# Patient Record
Sex: MALE | Race: AMERICAN INDIAN OR ALASKA NATIVE
[De-identification: names, ages, dates, MRNs, and addresses within clinical notes are randomized per-mention and may not be internally consistent; named-entity substitution may affect disease eponyms.]

---

## 2017-03-09 ENCOUNTER — HOSPITAL ENCOUNTER (INPATIENT)
Dept: HOSPITAL 5 - ED | Age: 52
LOS: 3 days | Discharge: HOME | DRG: 313 | End: 2017-03-12
Attending: HOSPITALIST | Admitting: INTERNAL MEDICINE
Payer: COMMERCIAL

## 2017-03-09 DIAGNOSIS — R91.1: ICD-10-CM

## 2017-03-09 DIAGNOSIS — Z79.899: ICD-10-CM

## 2017-03-09 DIAGNOSIS — I10: ICD-10-CM

## 2017-03-09 DIAGNOSIS — R16.0: ICD-10-CM

## 2017-03-09 DIAGNOSIS — R07.89: Primary | ICD-10-CM

## 2017-03-09 DIAGNOSIS — E66.9: ICD-10-CM

## 2017-03-09 DIAGNOSIS — E04.2: ICD-10-CM

## 2017-03-09 DIAGNOSIS — R73.9: ICD-10-CM

## 2017-03-09 LAB
ANION GAP SERPL CALC-SCNC: 16 MMOL/L
BASOPHILS NFR BLD AUTO: 0.8 % (ref 0–1.8)
BUN SERPL-MCNC: 16 MG/DL (ref 9–20)
BUN/CREAT SERPL: 14.54 %
CALCIUM SERPL-MCNC: 9 MG/DL (ref 8.4–10.2)
CHLORIDE SERPL-SCNC: 98.3 MMOL/L (ref 98–107)
CO2 SERPL-SCNC: 27 MMOL/L (ref 22–30)
EOSINOPHIL NFR BLD AUTO: 3.5 % (ref 0–4.3)
GLUCOSE SERPL-MCNC: 121 MG/DL (ref 75–100)
HCT VFR BLD CALC: 45 % (ref 35.5–45.6)
HGB BLD-MCNC: 14.3 GM/DL (ref 11.8–15.2)
MCH RBC QN AUTO: 26 PG (ref 28–32)
MCHC RBC AUTO-ENTMCNC: 32 % (ref 32–34)
MCV RBC AUTO: 83 FL (ref 84–94)
PLATELET # BLD: 190 K/MM3 (ref 140–440)
POTASSIUM SERPL-SCNC: 3.8 MMOL/L (ref 3.6–5)
RBC # BLD AUTO: 5.42 M/MM3 (ref 3.65–5.03)
SODIUM SERPL-SCNC: 137 MMOL/L (ref 137–145)
WBC # BLD AUTO: 7.1 K/MM3 (ref 4.5–11)

## 2017-03-09 PROCEDURE — 74178 CT ABD&PLV WO CNTR FLWD CNTR: CPT

## 2017-03-09 PROCEDURE — A9502 TC99M TETROFOSMIN: HCPCS

## 2017-03-09 PROCEDURE — 85379 FIBRIN DEGRADATION QUANT: CPT

## 2017-03-09 PROCEDURE — 80048 BASIC METABOLIC PNL TOTAL CA: CPT

## 2017-03-09 PROCEDURE — 93005 ELECTROCARDIOGRAM TRACING: CPT

## 2017-03-09 PROCEDURE — 71275 CT ANGIOGRAPHY CHEST: CPT

## 2017-03-09 PROCEDURE — 93017 CV STRESS TEST TRACING ONLY: CPT

## 2017-03-09 PROCEDURE — 78452 HT MUSCLE IMAGE SPECT MULT: CPT

## 2017-03-09 PROCEDURE — 83036 HEMOGLOBIN GLYCOSYLATED A1C: CPT

## 2017-03-09 PROCEDURE — 93010 ELECTROCARDIOGRAM REPORT: CPT

## 2017-03-09 PROCEDURE — 36415 COLL VENOUS BLD VENIPUNCTURE: CPT

## 2017-03-09 PROCEDURE — 80061 LIPID PANEL: CPT

## 2017-03-09 PROCEDURE — 86038 ANTINUCLEAR ANTIBODIES: CPT

## 2017-03-09 PROCEDURE — 82164 ANGIOTENSIN I ENZYME TEST: CPT

## 2017-03-09 PROCEDURE — 71020: CPT

## 2017-03-09 PROCEDURE — 93970 EXTREMITY STUDY: CPT

## 2017-03-09 PROCEDURE — 84484 ASSAY OF TROPONIN QUANT: CPT

## 2017-03-09 PROCEDURE — 84439 ASSAY OF FREE THYROXINE: CPT

## 2017-03-09 PROCEDURE — 85025 COMPLETE CBC W/AUTO DIFF WBC: CPT

## 2017-03-09 PROCEDURE — 84443 ASSAY THYROID STIM HORMONE: CPT

## 2017-03-09 NOTE — HISTORY AND PHYSICAL REPORT
History of Present Illness


Date of admission: 


03/09/17 11:48





Chief complaint: 





chest pain





History of present illness: 


51m w pmh of htn who presents with CP x 1 day, chest pain.  Which is dull 

subSternal, 4 out of 10, radiates to his right arm.  Not associated with 

exertion or rest.  Not associated with eating.  He denies having previous 

episodes of chest pain in the past. He denies cough, sputum production, fevers 

or abdominal pain








Past History


Past Medical History: hypertension


Past Surgical History: No surgical history


Social history: no significant social history, other (works as a fork lift 

)


Family history: no significant family history





Medications and Allergies


 Allergies











Allergy/AdvReac Type Severity Reaction Status Date / Time


 


No Known Allergies Allergy   Verified 03/09/17 02:51











 Home Medications











 Medication  Instructions  Recorded  Confirmed  Last Taken  Type


 


Hydrochlorothiazide [Hctz] 12.5 mg PO QDAY 03/09/17 03/09/17 03/08/17 History


 


Lisinopril [Zestril TAB] 10 mg PO QDAY 03/09/17 03/09/17 03/08/17 History











Active Meds: 


Active Medications





Acetaminophen (Tylenol)  650 mg PO Q4H PRN


   PRN Reason: Pain MILD(1-3)/Fever >100.5/HA


Aspirin (Ecotrin)  325 mg PO QDAY JOSÉ MIGUEL


Bisacodyl (Dulcolax)  10 mg MS QDAY PRN


   PRN Reason: Constipation unrelieved by MOM


Enoxaparin Sodium (Lovenox)  40 mg SUB-Q QDAY JOSÉ MIGUEL


Hydrochlorothiazide (Hctz)  12.5 mg PO QDAY JOSÉ MIGUEL


Lisinopril (Zestril)  10 mg PO QDAY JOSÉ MIGUEL


Magnesium Hydroxide (Milk Of Magnesia)  30 ml PO Q4H PRN


   PRN Reason: Constipation


Morphine Sulfate (Morphine)  2 mg IV Q4H PRN


   PRN Reason: Pain, Moderate (4-6)


Ondansetron HCl (Zofran)  4 mg IV Q8H PRN


   PRN Reason: N/V unrelieved by Reglan


Sodium Chloride (Sodium Chloride Flush Syringe 10 Ml)  10 ml IV PRN PRN


   PRN Reason: LINE FLUSH











Review of Systems


All systems: negative (as stated in HPI)





Exam





- Constitutional


Vitals: 


 











Temp Pulse Resp BP Pulse Ox


 


 98.5 F   92 H  17   112/61   100 


 


 03/09/17 02:51  03/09/17 11:00  03/09/17 11:00  03/09/17 11:00  03/09/17 11:00











General appearance: Present: no acute distress, well-nourished





- EENT


Eyes: Present: PERRL


ENT: hearing intact, clear oral mucosa





- Neck


Neck: Present: supple, normal ROM





- Respiratory


Respiratory effort: normal


Respiratory: bilateral: CTA





- Cardiovascular


Heart Sounds: Present: S1 & S2.  Absent: rub, click





- Extremities


Extremities: pulses symmetrical, No edema


Peripheral Pulses: within normal limits





- Abdominal


General gastrointestinal: Present: soft, non-tender, non-distended, normal 

bowel sounds


Male genitourinary: Present: normal





- Integumentary


Integumentary: Present: clear, warm, dry





- Musculoskeletal


Musculoskeletal: gait normal, strength equal bilaterally





- Psychiatric


Psychiatric: appropriate mood/affect, intact judgment & insight





- Neurologic


Neurologic: CNII-XII intact, moves all extremities





Results





- Labs


CBC & Chem 7: 


 03/09/17 03:25





 03/09/17 03:25


Labs: 


 Laboratory Last Values











WBC  7.1 K/mm3 (4.5-11.0)   03/09/17  03:25    


 


RBC  5.42 M/mm3 (3.65-5.03)  H  03/09/17  03:25    


 


Hgb  14.3 gm/dl (11.8-15.2)   03/09/17  03:25    


 


Hct  45.0 % (35.5-45.6)   03/09/17  03:25    


 


MCV  83 fl (84-94)  L  03/09/17  03:25    


 


MCH  26 pg (28-32)  L  03/09/17  03:25    


 


MCHC  32 % (32-34)   03/09/17  03:25    


 


RDW  14.8 % (13.2-15.2)   03/09/17  03:25    


 


Plt Count  190 K/mm3 (140-440)   03/09/17  03:25    


 


Lymph % (Auto)  32.2 % (13.4-35.0)   03/09/17  03:25    


 


Mono % (Auto)  10.7 % (0.0-7.3)  H  03/09/17  03:25    


 


Eos % (Auto)  3.5 % (0.0-4.3)   03/09/17  03:25    


 


Baso % (Auto)  0.8 % (0.0-1.8)   03/09/17  03:25    


 


Lymph #  2.3 K/mm3 (1.2-5.4)   03/09/17  03:25    


 


Mono #  0.8 K/mm3 (0.0-0.8)   03/09/17  03:25    


 


Eos #  0.2 K/mm3 (0.0-0.4)   03/09/17  03:25    


 


Baso #  0.1 K/mm3 (0.0-0.1)   03/09/17  03:25    


 


Seg Neutrophils %  52.8 % (40.0-70.0)   03/09/17  03:25    


 


Seg Neutrophils #  3.7 K/mm3 (1.8-7.7)   03/09/17  03:25    


 


Sodium  137 mmol/L (137-145)   03/09/17  03:25    


 


Potassium  3.8 mmol/L (3.6-5.0)   03/09/17  03:25    


 


Chloride  98.3 mmol/L ()   03/09/17  03:25    


 


Carbon Dioxide  27 mmol/L (22-30)   03/09/17  03:25    


 


Anion Gap  16 mmol/L  03/09/17  03:25    


 


BUN  16 mg/dL (9-20)   03/09/17  03:25    


 


Creatinine  1.1 mg/dL (0.8-1.5)   03/09/17  03:25    


 


Estimated GFR  > 60 ml/min  03/09/17  03:25    


 


BUN/Creatinine Ratio  14.54 %  03/09/17  03:25    


 


Glucose  121 mg/dL ()  H  03/09/17  03:25    


 


Calcium  9.0 mg/dL (8.4-10.2)   03/09/17  03:25    


 


Troponin T  < 0.010 ng/mL (0.00-0.029)   03/09/17  08:29    














- Imaging and Cardiology


Chest x-ray: image reviewed (no abnormalities seen)


CT scan - abdomen: image reviewed (poorly characterized and homogenous in liver 

mass)


CT scan - chest: image reviewed (left upper lobe mass/consolidation?)





Assessment and Plan


Assessment and plan: 





51-year-old man who presents with chest pain, he was found to have a left upper 

lobe lung nodule and questionable liver mass on imaging





1.  Chest pain


Serial troponin to rule out ACS, stress test has been ordered





2.  Hypertension


BP acceptable continue home medications





3.  Liver mass?


Obtain triple phase CT to better characterize the liver abnormality





4.  Lung nodule versus consolidation


Patient's clinical picture does not quite fit with pneumonia,, we'll obtain 

pulmonary consultation, differential diagnosis include infectious etiology, 

inflammatory etiology, or malignant etiology.  Follow-up triple phase CT of the 

liver








Plan of care discussed with patient/family: Yes

## 2017-03-09 NOTE — CONSULTATION
History of Present Illness


Consult date: 03/09/17


Requesting physician: ALEXUS GREEN


Reason for consult: lung mass, abnormal CXR/CT


History of present illness: 





PULMONARY/CCM CONSULT NOTE (Full dictation # [id number])


Please see dictated notes for full details





Past History


Past Medical History: hypertension





Medications and Allergies


 Allergies











Allergy/AdvReac Type Severity Reaction Status Date / Time


 


No Known Allergies Allergy   Verified 03/09/17 02:51











 Home Medications











 Medication  Instructions  Recorded  Confirmed  Last Taken  Type


 


Hydrochlorothiazide [Hctz] 12.5 mg PO QDAY 03/09/17 03/09/17 03/08/17 History


 


Lisinopril [Zestril TAB] 10 mg PO QDAY 03/09/17 03/09/17 03/08/17 History











Active Meds: 


Active Medications





Acetaminophen (Tylenol)  650 mg PO Q4H PRN


   PRN Reason: Pain MILD(1-3)/Fever >100.5/HA


Aspirin (Ecotrin)  325 mg PO QDAY JOSÉ MIGUEL


Bisacodyl (Dulcolax)  10 mg OK QDAY PRN


   PRN Reason: Constipation unrelieved by MOM


Enoxaparin Sodium (Lovenox)  40 mg SUB-Q QDAY JOSÉ MIGUEL


Hydrochlorothiazide (Hctz)  12.5 mg PO QDAY JOSÉ MIGUEL


Lisinopril (Zestril)  10 mg PO QDAY JOSÉ MIGUEL


Magnesium Hydroxide (Milk Of Magnesia)  30 ml PO Q4H PRN


   PRN Reason: Constipation


Morphine Sulfate (Morphine)  2 mg IV Q4H PRN


   PRN Reason: Pain, Moderate (4-6)


Ondansetron HCl (Zofran)  4 mg IV Q8H PRN


   PRN Reason: N/V unrelieved by Reglan


Sodium Chloride (Sodium Chloride Flush Syringe 10 Ml)  10 ml IV PRN PRN


   PRN Reason: LINE FLUSH











Physical Examination


Vital signs: 


 Vital Signs











Temp Pulse Resp BP Pulse Ox


 


 98.5 F   82   18   151/103   100 


 


 03/09/17 02:51  03/09/17 02:51  03/09/17 02:51  03/09/17 02:51  03/09/17 02:51














Results





- Laboratory Findings


CBC and BMP: 


 03/09/17 03:25





 03/09/17 03:25

## 2017-03-09 NOTE — ADMIT CRITERIA FORM
Admission Criteria Documentation: 





                                                CHEST PAIN





Clinical Indications for Admission to Inpatient Care





                                                                         (Place 

'X' for any and all applicable criteria): 





Admission is indicated for chest pain and ANY ONE of the following(1)(2)(3)(4)(5

): 





[ ]I.    Angina with acute coronary syndrome (Also use Myocardial Infarction or 

Angina guideline)


[ ]II.   Hemodynamic instability


[ ]III.  Angina needing acute intervention as indicated by ALL of the following(

11)(12):


        [ ]a)  Unstable angina is present as indicated by angina that is ANY ONE

 of the following:


                [ ]i)     New onset   


                [ ]ii)    Nocturnal   


                [ ]iii)   Prolonged at rest   


                [ ]iv)   Progressive


        [ ]b)  Angina warrants acute intervention as indicated by ANY ONE of 

the following:


                [ ]i)     Recurrent angina (e.g, not responding as previously 

to treatment)


                [ ]ii)     Angina at rest or with low-level activities despite 

initial medical therapy


                [ ]iii)    New or presumably new ST-segment depression on ECG


                [ ]iv)    Signs or symptoms of heart failure (eg, dyspnea, 

pulmonary edema)


                [ ]v)     New or worsening mitral regurgitation


                [ ]vi)    Hemodynamic instability


                [ ]vii)   Dangerous arrhythmia (eg, sustained ventricular 

tachycardia)          


                [ ]viii)   History of percutaneous coronary intervention within 

6 months          


                [ ]ix)    History of coronary artery bypass graft surgery


                [ ]x)    DERIK risk score of 2 or greater[A]


                [ ]xi)    History of Diabetes(14)


                [ ]xii)   High-risk cardiac ischemia findings on noninvasive 

testing (e.g, echocardiogram,


                          treadmill testing, nuclear scan)


                [ ]xiii)  Chronic renal insufficiency (ie, estimated GFR less 

than 60 mL/min/1.732m)


                [ ]xiv)  Left ventricular ejection fraction less than 40%


              


[ ]IV.   Evidence of MI (eg, cardiac biomarkers positive, ST-segment elevation 

on ECG) also use Myocardial Infarction Criteria Form.


[ ]V.    Pulmonary edema 


[ ]VI.   Respiratory distress


[ ]VII.  Chest pain indicative of serious diagnosis other than coronary artery 

disease (eg, aortic dissection)





[ ]VIII. Contraindications and/or Inappropriate clinical situations for 

Observational Care in patients


          with Chest Pain, when ANY ONE of the following is required:


          [ ]a)   Patient with risk factor for pulmonary embolism, acute 

coronary syndrome and myocardial infarction (18)


          [ ]b)   Patient with Pulmonary embolism require an average LOS of 4.3 

days, therefore emergency 


                   department observation management is inappropriate 18,23


          [ ]c)   Painful condition/s in the elderly, have the highest rate of 

recidivism after emergency department observation management (10.8%)  20,21,22


          [ ]d)   Elevated cardiac biomarker requires intensive and exhaustive 

care (19)


[X ]IX.  General contraindications and/or Inappropriate clinical situations for 

Observational Care in patients


          with Chest Pain, when ANY ONE of the following is required:


           [ X]a)   Prediction of prolongation of LOS based on ANY ONE of the 

following may be considered as 


                    a contraindication for observational care 2, 3, 4, 5, 6, 7, 

8, 9, 10, 11


                    [ ]i)    Age > 65 yrs.


                    [X ]ii)   Patient arriving by ambulance


                    [ ]iii)  Patient with high acuity


                    [ ]iv)  Patient requiring vital sign monitoring


                    [ ]v)   Patient on IV medication


           [ ]b)   Systolic blood pressures  180mmHg  3,12


           [ ]c)   Patient with altered mental status including delirium and 

other alteration of consciousness, (3)


           [ ]d)   Patient whose discharge disposition will be to a skilled 

nursing home or rehabilitation home should 


                    not be managed in Emergency Department Observation Unit. 

CMS rule requires 3 days hospital stay before such placement. 3,13


           [ ]e)   Patient with failure to thrive due to broad array of 

etiologies  3,16,17


           [ ]f)    Inability to ambulate  3,14








Extended stay beyond goal length of stay may be needed for (1)(28):


[ ]a)   Specific condition diagnosed after evaluation (eg, pulmonary embolism, 

aortic dissection) 


[ ]b)   Unstable angina


[ ]c)   Continued suspicion of acute coronary syndrome with inability to 

complete needed cardiac evaluation


         (eg, patient clinically unable to undergo stress testing)


[ ]d)   Myocardial infarction








(Contents from ANGINA and CHEST PAIN clinical indications for admission to 

inpatient care have been integrated in this form) 








The original [facility] content created by [employer] has been revised. 


The portions of the content which have been revised are identified through the 

use of italic text or in bold, and [facility] [employer]


has neither reviewed nor approved the modified material. All other unmodified 

content is copyright  [facility].





Please see references footnoted in the original [facility] [employer] online science and technology edition 

2016





Admission Criteria Met: Yes

## 2017-03-09 NOTE — CAT SCAN REPORT
CT angiography of the chest with 3-D reconstructed images.



Findings: There is a round circumscribed hypodensity in the right lobe 

of the thyroid measuring 1 cm in diameter. A 5 mm round hypodensity is 

seen in the left lobe of the thyroid.



There is a roughly 1.9 cm opacity in the left upper lobe with no air 

bronchogram. A subcentimeter nodular component is seen at the medial 

margin of this density. Otherwise the lungs are clear. The multiple 

nonenlarged nodes are seen in the anterior mediastinum. There is no 

pleural fluid. Bilateral small nodes are seen in the axilla.



On the images which include a portion of the upper abdomen, there is a 

somewhat rounded hypodensity in the posterior aspect of the right lobe 

of the liver with inhomogeneous enhancement measuring 2.3 cm in 

diameter.



Impression: 1. No evidence of pulmonary emboli.



2. Left upper lobe consolidation with small nodule, noncalcified. This 

could represent a malignant or benign inflammatory process. Imaging 

followup until clear is recommended unless biopsy is indicated on a 

clinical basis.



3. Bilateral small thyroid nodules.



4. Inhomogeneous enhancing mass in the right lobe of the liver. This is 

nonspecific, but could represent a hemangioma.

## 2017-03-09 NOTE — EMERGENCY DEPARTMENT REPORT
ED Chest Pain HPI





- General


Chief Complaint: Chest Pain


Stated Complaint: CHEST PAIN


Time Seen by Provider: 03/09/17 08:08


Source: patient, EMS


Mode of arrival: Ambulatory


Limitations: No Limitations





- History of Present Illness


MD Complaint: chest pain


-: Gradual


Pain Location: substernal


Pain Radiation: RUE


Severity: moderate


Severity scale (0 -10): 4


Quality: tightness


Consistency: intermittent


Improves With: nothing


Worsens With: nothing


re: dyspnea.  denies: nausea, vomting, diaphoresis


Other Symptoms: denies: cough, fever, syncope


Treatments Prior to Arrival: none


Aspirin use within the Past 7 Days: (1) Yes





- Related Data


 Home Medications











 Medication  Instructions  Recorded  Confirmed  Last Taken


 


Hydrochlorothiazide [Hctz] 12.5 mg PO QDAY 03/09/17 03/09/17 03/08/17


 


Lisinopril [Zestril TAB] 10 mg PO QDAY 03/09/17 03/09/17 03/08/17











 Allergies











Allergy/AdvReac Type Severity Reaction Status Date / Time


 


No Known Allergies Allergy   Verified 03/09/17 02:51














DERIK score





- Derik Score


Age > 65: (0) No


Aspirin use within the Past 7 Days: (1) Yes


3 or more CAD Risk Factors: (0) No


2 or more Angina events in past 24 hrs: (1) Yes


Known CAD with more than 50% Stenosis: (0) No


Elevated Cardiac Markers: (0) No


ST Deviation Greater than 0.5mm: (0) No


DERIK Score: 2





ED Review of Systems


ROS: 


Stated complaint: CHEST PAIN


Other details as noted in HPI





Constitutional: denies: chills, fever


Eyes: denies: eye pain, eye discharge, vision change


ENT: denies: ear pain, throat pain


Respiratory: denies: cough, shortness of breath, wheezing


Cardiovascular: denies: chest pain, palpitations


Endocrine: no symptoms reported


Gastrointestinal: denies: abdominal pain, nausea, diarrhea


Genitourinary: denies: urgency, dysuria


Musculoskeletal: denies: back pain, joint swelling, arthralgia


Skin: denies: rash, lesions


Neurological: denies: headache, weakness, paresthesias


Psychiatric: denies: anxiety, depression


Hematological/Lymphatic: denies: easy bleeding, easy bruising





ED Past Medical Hx





- Past Medical History


Previous Medical History?: Yes


Hx Hypertension: Yes





- Surgical History


Past Surgical History?: No





- Social History


Smoking Status: Never Smoker


Substance Use Type: Alcohol





- Medications


Home Medications: 


 Home Medications











 Medication  Instructions  Recorded  Confirmed  Last Taken  Type


 


Hydrochlorothiazide [Hctz] 12.5 mg PO QDAY 03/09/17 03/09/17 03/08/17 History


 


Lisinopril [Zestril TAB] 10 mg PO QDAY 03/09/17 03/09/17 03/08/17 History














ED Physical Exam





- General


Limitations: No Limitations


General appearance: alert, in no apparent distress





- Head


Head exam: Present: atraumatic, normocephalic





- ENT


ENT exam: Present: mucous membranes moist





- Neck


Neck exam: Present: normal inspection





- Respiratory


Respiratory exam: Present: normal lung sounds bilaterally.  Absent: respiratory 

distress





- Cardiovascular


Cardiovascular Exam: Present: regular rate, normal rhythm.  Absent: systolic 

murmur, diastolic murmur, rubs, gallop





- GI/Abdominal


GI/Abdominal exam: Present: soft, normal bowel sounds





- Extremities Exam


Extremities exam: Present: normal inspection





- Back Exam


Back exam: Present: normal inspection





- Skin


Skin exam: Present: warm, dry, intact, normal color.  Absent: rash





ED Course


 Vital Signs











  03/09/17 03/09/17 03/09/17





  02:51 08:03 08:47


 


Temperature 98.5 F  


 


Pulse Rate 82 85 83


 


Respiratory 18 24 20





Rate   


 


Blood Pressure   


 


Blood Pressure 151/103 136/88 125/83





[Right]   


 


O2 Sat by Pulse 100 96 96





Oximetry   














  03/09/17 03/09/17 03/09/17





  08:48 09:05 09:53


 


Temperature   


 


Pulse Rate 83 75 73


 


Respiratory   22





Rate   


 


Blood Pressure 125/83 125/69 120/70


 


Blood Pressure   





[Right]   


 


O2 Sat by Pulse   94





Oximetry   














  03/09/17 03/09/17 03/09/17





  10:00 11:00 12:00


 


Temperature   


 


Pulse Rate 72 92 H 76


 


Respiratory 27 H 17 19





Rate   


 


Blood Pressure 116/59 112/61 102/66


 


Blood Pressure   





[Right]   


 


O2 Sat by Pulse 90 100 93





Oximetry   














  03/09/17 03/09/17 03/09/17





  13:00 13:30 14:00


 


Temperature   


 


Pulse Rate 67 75 74


 


Respiratory 19 18 22





Rate   


 


Blood Pressure 103/44 110/72 119/65


 


Blood Pressure   





[Right]   


 


O2 Sat by Pulse 89 94 92





Oximetry   














ED Medical Decision Making





- Lab Data


Result diagrams: 


 03/09/17 03:25





 03/09/17 03:25





- EKG Data


EKG shows normal: sinus rhythm


Rate: normal





- EKG Data


When compared to previous EKG there are: no significant change


Interpretation: no acute changes





- Radiology Data





chest ct and cxr negative





- Medical Decision Making





talk to hospitalist about admitting this patient and get him stress inpatient, 

doing well , no pain at this time. workup negative so far





will need risk stratification as inpatient. likely stress test / enzymes x 3 

sets all negative . 


Critical care time in (mins) excluding proc time.: 35


Critical care attestation.: 


If time is entered above; I have spent that time in minutes in the direct care 

of this critically ill patient, excluding procedure time.





Critical Care Time: 





35





ED Disposition


Clinical Impression: 


 Chest pain





Disposition: OP ADMITTED AS IP TO THIS HOSP


Is pt being admited?: Yes


Does the pt Need Aspirin: Yes


Condition: Stable


Time of Disposition: 10:42

## 2017-03-09 NOTE — XRAY REPORT
Chest 2 views:



History: Chest pain.



Findings:



Normal cardiomediastinal silhouette. Trachea is midline. No 

consolidation, pneumothorax or pleural effusion.



Impression:



No acute cardiopulmonary findings.

## 2017-03-10 LAB
CHOLEST SERPL-MCNC: 172 MG/DL (ref 50–199)
HDLC SERPL-MCNC: 55 MG/DL (ref 40–59)
TRIGL SERPL-MCNC: 61 MG/DL (ref 2–149)

## 2017-03-10 RX ADMIN — ASPIRIN SCH MG: 325 TABLET, COATED ORAL at 18:44

## 2017-03-10 RX ADMIN — ENOXAPARIN SODIUM SCH MG: 100 INJECTION SUBCUTANEOUS at 18:44

## 2017-03-10 RX ADMIN — LISINOPRIL SCH MG: 10 TABLET ORAL at 18:44

## 2017-03-10 RX ADMIN — HYDROCHLOROTHIAZIDE SCH MG: 12.5 CAPSULE ORAL at 18:44

## 2017-03-10 NOTE — CONSULTATION
CONSULTING PHYSICIAN:  Bairon Ngo MD 



REASON FOR CONSULTATION:  Abnormal CT scan, lung mass, please evaluate.



CHIEF COMPLAINT AND HISTORY OF PRESENT ILLNESS:  The patient is a 51-year-old

-American male with past medical history significant only for high blood

pressure came into the Emergency Room yesterday complaining of chest pain that

have been going on for about 24 hours.  It was over the anterior chest,

retrosternal to some extent.  It was radiating to his right ____.  He did

complain of some pleuritic component.  He stated that whenever he coughs, he

would feel the pain worse.  It was not worsened by eating or swallowing.  He

denied any real fevers or chills.  He denied nausea, vomiting, or overt

aspiration.  He denied any gross or streaky hemoptysis.  He denied any prior

episodes.  He denied also any history of venous thromboembolic phenomenon.  He

was observed in the Emergency Room and a decision was made to admit him, rule

out an acute coronary syndrome; however, he also had a CTA of his chest done,

which while negative for pulmonary emboli, reported an abnormal left upper lobe

1.9 cm opacity with no air bronchogram that was suspicious for a lung nodule

that was noncalcified, hence the consult.  When I stopped by to see him, he was

resting in bed, feeling a little bit better.  The chest pain was better.  He

does have a less than 10 pack year tobacco smoking history and tells me that he

has quit smoking for a while now.  He denied any new lumps, bumps, or swellings

on his body.  He denied any significant family history of cancer.  He has never

really had a chest x-ray in the past and certainly has never been told he had an

abnormal chest x-ray.  When asked about possible connective tissue disease

related signs and symptoms, he denied.  He certainly denied any known history of

sarcoidosis in the family or connective tissue disorders.  He denied any

significant joint pain or swelling.  Denied any significant discoloration of his

extremities in cold weather or cold weather related pain or swelling.  He denied

any significant odynophagia.  He denied any chronic oropharyngeal dryness.  That

really is as much of the history of this presentation as I have.  He also denies

any occupational exposures to known pulmonary toxins.  He did say that in the

past he did a lot of hard drugs, but denied significant crack inhalation or

other inhalations.



PAST MEDICAL HISTORY:  Significant again for high blood pressure, he is also

obese.



PAST SURGICAL HISTORY:  Denies.



MEDICATIONS:  He was on at the time I stopped by to see him, according to the

medication administration record included the following:  Tylenol 650 mg p.o. q.

4 hours p.r.n., aspirin 325 mg p.o. daily, Lovenox 40 mg subcutaneous daily,

hydrochlorothiazide 12.5 mg p.o. daily, Zestril 10 mg p.o. daily, morphine

sulfate 2 mg IV q. 4 hours p.r.n. moderate pain, Zofran 4 mg IV q. 8 hours

p.r.n. nausea and vomiting, p.r.n. milk of magnesia.



ALLERGIES:  No known drug allergies.



DIET:  Obese gentleman.  Denies significant weight loss or gain in preceding few

weeks to months.



FAMILY AND SOCIAL HISTORY:  Lives in the community.  Less than 10 pack year

tobacco smoking history.  No current alcohol or illicit drug use or abuse.  He

does have a history of illicit drug use in the past.



FAMILY HISTORY:  Unremarkable.



REVIEW OF SYSTEMS:  No loss of consciousness.  No new onset seizures.  No new

onset focal weakness.  No gross hematochezia or melena.  No gross hematuria or

dysuria.  No hematemesis.  No hemoptysis.  No palpitations.  Complete review of

systems obtained.  Pertinent positives and/or negatives as in body of history

above, otherwise they are noncontributory.



PHYSICAL EXAMINATION:

VITAL SIGNS:  At presentation, he was afebrile, temperature 98.5, pulse 82,

respiratory rate 18, blood pressure 151/103, oxygen sats were 100%, inspired

oxygen concentration was not recorded.

HEAD, EYES, EARS, NOSE, AND THROAT:  Pupils are equal, round, about 3-4 mm,

reactive to light.  Extraocular muscle movements are intact.  Oropharynx is a

Mallampati #3 oropharynx without significant posterior oropharyngeal erythema. 

Grossly, there were no palpable lymph nodes in the supraclavicular,

submandibular, or axillary lymph nodes.

LUNGS:  Auscultation of both lung fields unremarkable.  Lungs were clear

bilaterally.

HEART:  Heart sounds 1 and 2 were heard.  There were regular rate and rhythm at

the time of my evaluation.

ABDOMEN:  Soft, full, bowel sounds were positive, nontender.

EXTREMITIES:  Without overt digital clubbing, cyanosis, or pedal edema.

NEUROLOGIC:  The exam was grossly nonfocal.



LABORATORY DATA:  From my review are as follows:  White cell count 7100,

hemoglobin 14.3, hematocrit 45.0, platelets 190.  Serum sodium 137, potassium

3.8, chloride 98, bicarbonate 27, BUN 16, creatinine 1.1, and glucose of 121. 

Cardiac enzymes have been negative so far.  No microbiology studies.  I have

reviewed the CT scan.  I have also reviewed the radiologist's report.  I should

mention that the CT scan shows obvious motion artifact.  The patient was

breathing during the exam and this obscures the interpretation of the films

really with confluence of shadows and so multiple lesions could appear at one. 

I do note the left upper lobe area in question; however, it is hard to say if

this is really vasculature that has been obscured by the motion of the chest

wall or the pulmonary parenchyma, there probably does appear to be something

else going on in that area, it could just be consolidation certainly.  I do not

see any significant mediastinal adenopathy, no gross pneumothorax.



ASSESSMENT AND PLAN:  We have a middle-aged gentleman, less than 10 pack year

smoker, coming in with chest pain and history of hypertension, certainly, does

require acute coronary syndrome workup.  From a respiratory standpoint, I will

want to do a couple of things, first of all I will get a stat D-dimer level and

if positive, I will complete the venous thromboembolic disorder workup with

bilateral lower extremity Dopplers.  I have explained the findings on his chest

to him.  I am not so certain a biopsy is indicated at this time.  I will elect

to treat him empirically for 5 days with Levaquin for possible

community-acquired pneumonia, and then, I suggest that we should go with a PET

scan and see if there is any true indication to go after this lesion.  At that

point, a decision can be made on biopsy or not.  I am not so sure that the

benefits outweigh the risks at this point, especially in the setting of a

possible acute coronary syndrome.  I have counseled continued tobacco

abstinence.  He will be placed on GI prophylaxis and flu and pneumonia

vaccination should be per protocol.  I should mention I will get an SHAHLA level as

a connective tissue disease screen and also an angiotensin converting enzyme

level as a surrogate marker for sarcoidosis.



Thank you very much for the consult, Dr. Ngo.  We will follow along.  We

will make further recommendations as picture progresses/becomes clearer.





DD: 03/10/2017 10:22

DT: 03/10/2017 23:22

JOB# [id number]  [id number]

AJM/ARABELLA

## 2017-03-10 NOTE — PROGRESS NOTE
Assessment and Plan


Assessment and plan: 





51-year-old man who presents with chest pain, found to have a left upper lobe 

lung nodule and questionable liver mass on imaging





1.  Chest pain


Cardiac enzymes negative


EKG with no acute ischemic changes


Stress test obtained, results pending


Check hemoglobin A1c and lipid profile





2.  Hypertension


BP controlled on lisinopril, HCTZ





3.  Lung nodule versus consolidation


Clinical picture not c/w pneumonia


Pulmonary consulted and additional workup in progress


Differential - inflammatory versus infectious versus malignancy





4.  Liver mass


Obtain triple phase CT to better characterize the liver abnormality





5.  Hyperglycemia


Check hemoglobin A1c to differentiate reactive hyperglycemia versus diabetes





6.  DVT/GI prophylaxis





History


Interval history: 





chest pain subsided, no complaints





Hospitalist Physical





- Constitutional


Vitals: 


 











Temp Pulse Resp BP Pulse Ox


 


 98.0 F   86   18   162/81   98 


 


 03/10/17 04:00  03/10/17 10:09  03/10/17 04:00  03/10/17 10:09  03/10/17 14:32











General appearance: Present: no acute distress, well-nourished





- EENT


Eyes: Present: PERRL, EOM intact.  Absent: scleral icterus, conjunctival 

injection





- Neck


Neck: Present: supple, normal ROM.  Absent: masses or JVD





- Respiratory


Respiratory effort: normal


Respiratory: bilateral: CTA, negative: rhonchi, wheezing





- Cardiovascular


Rhythm: regular


Heart Sounds: Present: S1 & S2.  Absent: systolic murmur





- Extremities


Extremities: no ischemia





- Abdominal


General gastrointestinal: soft, non-tender, non-distended, normal bowel sounds





- Integumentary


Integumentary: Present: warm, dry.  Absent: jaundice, rash





- Psychiatric


Psychiatric: cooperative





- Neurologic


Neurologic: CNII-XII intact, no focal deficits





Results





- Labs


CBC & Chem 7: 


 03/09/17 03:25





 03/09/17 03:25


Labs: 


 Laboratory Last Values











WBC  7.1 K/mm3 (4.5-11.0)   03/09/17  03:25    


 


RBC  5.42 M/mm3 (3.65-5.03)  H  03/09/17  03:25    


 


Hgb  14.3 gm/dl (11.8-15.2)   03/09/17  03:25    


 


Hct  45.0 % (35.5-45.6)   03/09/17  03:25    


 


MCV  83 fl (84-94)  L  03/09/17  03:25    


 


MCH  26 pg (28-32)  L  03/09/17  03:25    


 


MCHC  32 % (32-34)   03/09/17  03:25    


 


RDW  14.8 % (13.2-15.2)   03/09/17  03:25    


 


Plt Count  190 K/mm3 (140-440)   03/09/17  03:25    


 


Lymph % (Auto)  32.2 % (13.4-35.0)   03/09/17  03:25    


 


Mono % (Auto)  10.7 % (0.0-7.3)  H  03/09/17  03:25    


 


Eos % (Auto)  3.5 % (0.0-4.3)   03/09/17  03:25    


 


Baso % (Auto)  0.8 % (0.0-1.8)   03/09/17  03:25    


 


Lymph #  2.3 K/mm3 (1.2-5.4)   03/09/17  03:25    


 


Mono #  0.8 K/mm3 (0.0-0.8)   03/09/17  03:25    


 


Eos #  0.2 K/mm3 (0.0-0.4)   03/09/17  03:25    


 


Baso #  0.1 K/mm3 (0.0-0.1)   03/09/17  03:25    


 


Seg Neutrophils %  52.8 % (40.0-70.0)   03/09/17  03:25    


 


Seg Neutrophils #  3.7 K/mm3 (1.8-7.7)   03/09/17  03:25    


 


D-Dimer  267.97 ng/mlDDU (0-234)  H  03/10/17  12:50    


 


Sodium  137 mmol/L (137-145)   03/09/17  03:25    


 


Potassium  3.8 mmol/L (3.6-5.0)   03/09/17  03:25    


 


Chloride  98.3 mmol/L ()   03/09/17  03:25    


 


Carbon Dioxide  27 mmol/L (22-30)   03/09/17  03:25    


 


Anion Gap  16 mmol/L  03/09/17  03:25    


 


BUN  16 mg/dL (9-20)   03/09/17  03:25    


 


Creatinine  1.1 mg/dL (0.8-1.5)   03/09/17  03:25    


 


Estimated GFR  > 60 ml/min  03/09/17  03:25    


 


BUN/Creatinine Ratio  14.54 %  03/09/17  03:25    


 


Glucose  121 mg/dL ()  H  03/09/17  03:25    


 


Hemoglobin A1c  5.4 % (4-6)   03/10/17  12:50    


 


Calcium  9.0 mg/dL (8.4-10.2)   03/09/17  03:25    


 


Troponin T  < 0.010 ng/mL (0.00-0.029)   03/09/17  08:29    


 


Triglycerides  61 mg/dL (2-149)   03/10/17  12:50    


 


Cholesterol  172 mg/dL ()   03/10/17  12:50    


 


LDL Cholesterol Direct  105 mg/dL ()   03/10/17  12:50    


 


HDL Cholesterol  55 mg/dL (40-59)   03/10/17  12:50    


 


Cholesterol/HDL Ratio  3.12 %  03/10/17  12:50    














- Imaging and Cardiology


CT scan - abdomen: pending


CT scan - chest: report reviewed (JUAN small nodule; bilateral small thyroid 

nodules; enhancing mass right lobe liver)


Imaging and Cardiology: 





Stest test pending

## 2017-03-10 NOTE — TREADMILL REPORT
INDICATION FOR PROCEDURE:  Chest pain.



ORDERING PHYSICIAN:  Bairon Ngo MD



FINDINGS:  There is no scintigraphic evidence of myocardial ischemia.  The left

ventricle is normal in size and systolic function with the left ventricular

ejection fraction measured at 62%.



CONCLUSION:

1.  Normal perfusion scan.

2.  Low risk myocardial perfusion study associated with pneumonia,

cardiovascular mortality of less than 1%.





DD: 03/10/2017 11:24

DT: 03/10/2017 21:47

JOB# [id number]  [id number]

TAWNYA/NTS

## 2017-03-11 RX ADMIN — HYDROCHLOROTHIAZIDE SCH: 12.5 CAPSULE ORAL at 09:47

## 2017-03-11 RX ADMIN — ASPIRIN SCH: 325 TABLET, COATED ORAL at 09:47

## 2017-03-11 RX ADMIN — LISINOPRIL SCH: 10 TABLET ORAL at 09:48

## 2017-03-11 RX ADMIN — ENOXAPARIN SODIUM SCH: 100 INJECTION SUBCUTANEOUS at 09:48

## 2017-03-11 NOTE — PROGRESS NOTE
Assessment and Plan


Assessment and plan: 





51-year-old man who presents with chest pain, found to have a left upper lobe 

lung nodule and questionable liver mass on imaging





1.  Chest pain


Cardiac enzymes negative


EKG with no acute ischemic changes


A1C and lipid profile wnl


Stress test negative


Not cardiac - additional pulm w/u in progress





2.  Hypertension


BP controlled on lisinopril, HCTZ





3.  Lung nodule versus consolidation


Clinical picture not c/w pneumonia


Differential - inflammatory versus infectious versus malignancy


Pulmonary consulted and ddimer, bilat LE Doppler obtained and DVT ruled out; 

considering PET scan; SHAHLA and ACE pending





4.  Liver mass


Obtain triple phase CT to better characterize the liver abnormality





5.  Hyperglycemia


Reactive, A1C 5.4





6.  DVT/GI prophylaxis





7. Discharge plan 


Based on pulm w/u and recom





History


Interval history: 





doing well, no complaints





Hospitalist Physical





- Constitutional


Vitals: 


 











Temp Pulse Resp BP Pulse Ox


 


 97.5 F L  59 L  18   126/68   98 


 


 03/11/17 11:22  03/11/17 11:22  03/11/17 11:22  03/11/17 11:22  03/11/17 11:22











General appearance: Present: no acute distress, well-nourished





- EENT


Eyes: Present: PERRL, EOM intact.  Absent: scleral icterus, conjunctival 

injection





- Neck


Neck: Present: supple, normal ROM.  Absent: masses or JVD





- Respiratory


Respiratory effort: normal


Respiratory: bilateral: CTA, negative: rhonchi, wheezing





- Cardiovascular


Rhythm: regular


Heart Sounds: Present: S1 & S2.  Absent: systolic murmur





- Extremities


Extremities: no ischemia, No edema





- Abdominal


General gastrointestinal: soft, non-tender, non-distended, normal bowel sounds





- Integumentary


Integumentary: Present: warm, dry.  Absent: jaundice, rash





- Psychiatric


Psychiatric: cooperative





- Neurologic


Neurologic: CNII-XII intact, no focal deficits





Results





- Labs


CBC & Chem 7: 


 03/09/17 03:25





 03/09/17 03:25


Labs: 


 Laboratory Last Values











WBC  7.1 K/mm3 (4.5-11.0)   03/09/17  03:25    


 


RBC  5.42 M/mm3 (3.65-5.03)  H  03/09/17  03:25    


 


Hgb  14.3 gm/dl (11.8-15.2)   03/09/17  03:25    


 


Hct  45.0 % (35.5-45.6)   03/09/17  03:25    


 


MCV  83 fl (84-94)  L  03/09/17  03:25    


 


MCH  26 pg (28-32)  L  03/09/17  03:25    


 


MCHC  32 % (32-34)   03/09/17  03:25    


 


RDW  14.8 % (13.2-15.2)   03/09/17  03:25    


 


Plt Count  190 K/mm3 (140-440)   03/09/17  03:25    


 


Lymph % (Auto)  32.2 % (13.4-35.0)   03/09/17  03:25    


 


Mono % (Auto)  10.7 % (0.0-7.3)  H  03/09/17  03:25    


 


Eos % (Auto)  3.5 % (0.0-4.3)   03/09/17  03:25    


 


Baso % (Auto)  0.8 % (0.0-1.8)   03/09/17  03:25    


 


Lymph #  2.3 K/mm3 (1.2-5.4)   03/09/17  03:25    


 


Mono #  0.8 K/mm3 (0.0-0.8)   03/09/17  03:25    


 


Eos #  0.2 K/mm3 (0.0-0.4)   03/09/17  03:25    


 


Baso #  0.1 K/mm3 (0.0-0.1)   03/09/17  03:25    


 


Seg Neutrophils %  52.8 % (40.0-70.0)   03/09/17  03:25    


 


Seg Neutrophils #  3.7 K/mm3 (1.8-7.7)   03/09/17  03:25    


 


D-Dimer  267.97 ng/mlDDU (0-234)  H  03/10/17  12:50    


 


Sodium  137 mmol/L (137-145)   03/09/17  03:25    


 


Potassium  3.8 mmol/L (3.6-5.0)   03/09/17  03:25    


 


Chloride  98.3 mmol/L ()   03/09/17  03:25    


 


Carbon Dioxide  27 mmol/L (22-30)   03/09/17  03:25    


 


Anion Gap  16 mmol/L  03/09/17  03:25    


 


BUN  16 mg/dL (9-20)   03/09/17  03:25    


 


Creatinine  1.1 mg/dL (0.8-1.5)   03/09/17  03:25    


 


Estimated GFR  > 60 ml/min  03/09/17  03:25    


 


BUN/Creatinine Ratio  14.54 %  03/09/17  03:25    


 


Glucose  121 mg/dL ()  H  03/09/17  03:25    


 


Hemoglobin A1c  5.4 % (4-6)   03/10/17  12:50    


 


Calcium  9.0 mg/dL (8.4-10.2)   03/09/17  03:25    


 


Troponin T  < 0.010 ng/mL (0.00-0.029)   03/09/17  08:29    


 


Triglycerides  61 mg/dL (2-149)   03/10/17  12:50    


 


Cholesterol  172 mg/dL ()   03/10/17  12:50    


 


LDL Cholesterol Direct  105 mg/dL ()   03/10/17  12:50    


 


HDL Cholesterol  55 mg/dL (40-59)   03/10/17  12:50    


 


Cholesterol/HDL Ratio  3.12 %  03/10/17  12:50

## 2017-03-11 NOTE — PROGRESS NOTE
Assessment and Plan





- Patient Problems


(1) Lung nodule


Current Visit: Yes   Status: Acute   


Plan to address problem: 





- follow SHAHLA, ACE levels


- needs PET scan


- will review films with radiology








(2) Chest pain


Current Visit: Yes   Status: Acute   


Qualifiers: 


   Chest pain type: C 


Plan to address problem: 





- negative VTE w/up


- clinically improved


- complete ACS w/up








Subjective


Date of service: 03/11/17


Principal diagnosis: Atypical Chest Pain; Lung Nodule


Interval history: 





Seen and examined at bedside; 24 hour events reviewed; nursing and respiratory 

care staff consulted; no adverse overnight events reported to me; no new issues 

respiratory-wise; awaiting SHAHLA & ACE levels; no hemoptysis





Objective


 Vital Signs - 12hr











  03/11/17 03/11/17 03/11/17





  04:00 08:23 11:22


 


Temperature 98.1 F  97.5 F L


 


Pulse Rate  60 


 


Pulse Rate [ 65  59 L





From Monitor]   


 


Respiratory 18  18





Rate   


 


Blood Pressure 125/72  126/68





[Right Arm]   


 


O2 Sat by Pulse 95 97 98





Oximetry   











Constitutional: no acute distress


Eyes: non-icteric


ENT: oropharynx moist


Neck: supple, no lymphadenopathy


Effort: normal


Ascultation: Bilateral: clear


Cardiovascular: regular rate and rhythm


Gastrointestinal: normoactive bowel sounds, soft, non-tender, non-distended


Integumentary: normal


Extremities: no cyanosis, no edema, pulses normal, no ischemia or petechiae


Neurologic: normal mental status, non-focal exam, pupils equal and round, motor 

strength normal and


Psychiatric: mood appropriate, affect normal


CBC and BMP: 


 03/09/17 03:25





 03/09/17 03:25


ABG, PT/INR, D-dimer: 


PT/INR, D-dimer











D-Dimer  267.97 ng/mlDDU (0-234)  H  03/10/17  12:50    








Abnormal lab findings: 


 Abnormal Labs











  03/10/17





  12:50


 


D-Dimer  267.97 H











CT scan - chest: image reviewed

## 2017-03-12 VITALS — SYSTOLIC BLOOD PRESSURE: 124 MMHG | DIASTOLIC BLOOD PRESSURE: 67 MMHG

## 2017-03-12 NOTE — CAT SCAN REPORT
CT SCAN OF THE ABDOMEN AND PELVIS WITHOUT AND WITH CONTRAST:



HISTORY: Liver mass.



TECHNIQUE: Helical CT before and after IV contrast. Sagittal and 

coronal reformatted images. Triple phase liver protocol.



FINDINGS:



The CTA chest dated 3/9/17 was reviewed. The 3.3 cm right hepatic lobe 

lesion demonstrates peripheral nodular enhancement with near-complete 

filling on the delayed images. This is typical of a cavernous 

hemangioma with type II enhancement pattern. The remainder of the liver 

is normal attenuation. No parenchymal disease or additional mass. The 

hepatic vasculature is patent. The biliary system is unremarkable.



The spleen and pancreas demonstrate a normal size and attenuation

with no evidence of abnormal mass.



The kidneys are normal in size and position with no evidence of

hydronephrosis or mass.  1 cm simple cyst of the mid right kidney is 

noted. The adrenal glands are normal.  There is no intestinal 

obstruction or ascites.  The abdominal aorta is normal.



No abnormalities are identified within the retroperitoneum or

mesentery.



There is no evidence of peritoneal air or fluid.  There is no

evidence of any abnormal masses or fluid collections within the

pelvis. No adenopathy is identified.  The bladder is normal.



IMPRESSION:

3.3 cm cavernous hemangioma of the right hepatic lobe. No suspicious 

liver mass.

Otherwise, unremarkable exam of the abdomen and pelvis.

## 2017-03-12 NOTE — PROGRESS NOTE
Assessment and Plan





- Patient Problems


(1) Lung nodule


Current Visit: Yes   Status: Acute   


Plan to address problem: 





- follow SHAHLA, ACE levels


- needs PET scan


- set up outpatient PET scan pre-discharge


- outpatient f/up in 1 week with Dr. Garica











(2) Chest pain


Current Visit: Yes   Status: Acute   


Qualifiers: 


   Chest pain type: C 


Plan to address problem: 





- negative VTE w/up


- clinically improved


- complete ACS w/up











DISCHARGE PLANNING:


- I have stressed the importance of f/up to Mr. Christian indicating that the lung 

nodule could be a lung cancer and can kill him if he does not follow up on the 

scheduled PET scan and the outpatient clinic f/up. Same has been re-iterated to 

him by his nurse and attending physician


1. schedule PET ASAP


2. give outpatient pulmonary clinic f/up with Dr. Garcia in 1 week








Subjective


Date of service: 03/12/17


Principal diagnosis: Atypical Chest Pain; Lung Nodule


Interval history: 





Seen and examined at bedside; 24 hour events reviewed; nursing and respiratory 

care staff consulted; no adverse overnight events reported to me; resting in bed

; denies acute chest pains or increased SOB; wants to go home; No N/V/F/C





Objective


 Vital Signs - 12hr











  03/12/17 03/12/17 03/12/17





  01:58 04:30 08:00


 


Temperature 98.4 F 98.0 F 98.4 F


 


Pulse Rate [   60





Apical]   


 


Pulse Rate [ 75 57 L 





Left Radial]   


 


Respiratory 20 20 18





Rate   


 


Blood Pressure 119/63 107/57 124/67





[Left Arm]   


 


O2 Sat by Pulse 97 97 98





Oximetry   











Constitutional: no acute distress


Eyes: non-icteric


ENT: oropharynx moist


Neck: supple, no lymphadenopathy


Effort: normal


Ascultation: Bilateral: clear


Cardiovascular: regular rate and rhythm


Gastrointestinal: normoactive bowel sounds, soft, non-tender, non-distended


Integumentary: normal


Extremities: no cyanosis, no edema, pulses normal, no ischemia or petechiae


Neurologic: normal mental status, non-focal exam, pupils equal and round, motor 

strength normal and


Psychiatric: mood appropriate, affect normal


CBC and BMP: 


 03/09/17 03:25





 03/09/17 03:25


ABG, PT/INR, D-dimer: 


PT/INR, D-dimer











D-Dimer  267.97 ng/mlDDU (0-234)  H  03/10/17  12:50    








Abnormal lab findings: 


 Abnormal Labs











  03/10/17





  12:50


 


D-Dimer  267.97 H

## 2017-03-12 NOTE — DISCHARGE SUMMARY
Providers





- Providers


Date of Admission: 


03/09/17 11:48





Date of discharge: 03/12/17


Attending physician: 


FLORIDA MEADOWS





 





03/09/17


Consult to Cardiac Rehabilitation [CONS] Routine 


   Reason For Exam: Phase I





03/09/17 12:34


Consult to Physician [CONS] Routine 


   Consulting Provider: JAY LLOYD


   Reason For Exam: Left upper lobe nodule?


   Place consult to:: jose


   Notified:: yes


   Was contact made?: Yes


   If yes, spoke with:: lauren


   Time called:: 14:16











Primary care physician: 


PRIMARY CARE MD








Hospitalization


Reason for admission: chest pain


Condition: Stable


Pertinent studies: 





CXR





CTA chest





CT abdomen/pelvis





Stress test





Doppler lower extremities








Hospital course: 





Patient is a 51 years old -American male with hypertension who presented 

to the hospital complaining of chest pain.  Acute coronary syndrome was 

excluded based on normal cardiac enzymes and negative stress test.  Because d-

dimer was elevated he underwent bilateral lower extremity Doppler CTA chest 

that showed no DVT/SVT, as well as, no PE, but incidental findings noted - lung 

nodule, bilateral small thyroid nodules and liver mass; additional CT abdomen/

pelvis obtained and liver mass excluded, cavernous hemangioma being present.  

Pulmonary was consulted and additional workup ordered; differential for his 

lung nodule is inflammatory versus infectious versus malignancy; SHAHLA and ACE 

pending. Also he is scheduled for outpatient PET scan, test requested by 

pulmonary before any invasive procedure, then he will f/u with Dr. Laura.

  He was also advised to follow with an endocrinologist of his choice regarding 

the thyroid nodules.





Discharge diagnosis:





1.  Chest pain - ACS and PE excluded; possible musculoskeletal





2.  Lung nodule - scheduled for PET scan and then will follow up with pulmonary





3.  Right hepatic lobe hemangioma





4.  Bilateral thyroid nodules - TSH and FT4 within normal limits; follow-up 

with endocrinology





5.  Hypertension - BP controlled on lisinopril HCTZ





6.  Hyperglycemia - reactive





7.  Obesity - consult regarding importance of losing weight and lifestyle 

changes











Disposition: DISCHARGED TO HOME OR SELFCARE


Time spent for discharge: 35 min





Core Measure Documentation





- Palliative Care


Palliative Care/ Comfort Measures: Not Applicable





- Core Measures


Any of the following diagnoses?: none





Exam





- Physical Exam


Narrative exam: 





Patient seen and examined;





- Constitutional


Vitals: 


 











Temp Pulse Resp BP Pulse Ox


 


 98.4 F   60   18   124/67   98 


 


 03/12/17 08:00  03/12/17 08:00  03/12/17 08:00  03/12/17 08:00  03/12/17 08:00











General appearance: Present: no acute distress





- EENT


Eyes: Present: PERRL, EOM intact.  Absent: scleral icterus, conjunctival 

injection





- Neck


Neck: Present: supple, normal ROM.  Absent: masses or JVD





- Respiratory


Respiratory effort: normal


Respiratory: bilateral: CTA, negative: rales, rhonchi, wheezing





- Cardiovascular


Rhythm: regular


Heart Sounds: Present: S1 & S2.  Absent: systolic murmur





- Extremities


Extremities: no ischemia, No edema





- Abdominal


General gastrointestinal: Present: soft, non-tender, non-distended, normal 

bowel sounds





- Integumentary


Integumentary: Present: warm, dry.  Absent: jaundice, rash





- Musculoskeletal


Musculoskeletal: strength equal bilaterally





- Psychiatric


Psychiatric: cooperative





- Neurologic


Neurologic: CNII-XII intact, no focal deficits





Plan


Activity: no restrictions


Diet: low cholesterol, low salt


Additional Instructions: PET scan as scheduled, then follow up with Pulmonary.  

Follow up with an endocrinologist of your choice regarding thyroid nodules


Follow up with: 


PRIMARY CARE,MD [Primary Care Provider] - 3-5 Days


RENE LAURA MD [Staff Physician] - 7 Days


Forms:  Work/School Release Form

## 2017-03-13 NOTE — VASCULAR LAB REPORT
LOWER EXTREMITY VENOUS DUPLEX:



REASON FOR EXAM: Swelling of the lower extremities.



COMMENTS ON THE RIGHT:

All veins visualized are freely compressible without evidence of

internal echogenicity.  Flow is spontaneous and phasic throughout.



COMMENTS ON THE LEFT:

All veins visualized are freely compressible without evidence of

internal echogenicity.  Flow is spontaneous and phasic throughout.



IMPRESSION:

No evidence of acute or chronic deep venous thrombosis in either

lower extremity.

## 2019-01-07 ENCOUNTER — HOSPITAL ENCOUNTER (EMERGENCY)
Dept: HOSPITAL 5 - ED | Age: 54
LOS: 1 days | Discharge: HOME | End: 2019-01-08
Payer: COMMERCIAL

## 2019-01-07 DIAGNOSIS — M54.40: Primary | ICD-10-CM

## 2019-01-07 DIAGNOSIS — G89.29: ICD-10-CM

## 2019-01-07 DIAGNOSIS — I10: ICD-10-CM

## 2019-01-07 PROCEDURE — 99282 EMERGENCY DEPT VISIT SF MDM: CPT

## 2019-01-08 VITALS — SYSTOLIC BLOOD PRESSURE: 142 MMHG | DIASTOLIC BLOOD PRESSURE: 95 MMHG

## 2019-01-08 NOTE — EMERGENCY DEPARTMENT REPORT
ED Back Pain/Injury HPI





- General


Chief Complaint: Back Pain/Injury


Stated Complaint: LOWER BACK PAIN


Time Seen by Provider: 19 00:07


Source: patient


Limitations: No Limitations





- History of Present Illness


Initial Comments: 


pt is a 54 y/o aam with hx of low back pain who  presents for chronic low back 

pain no new fall injury or trauma pain requesting pain medication. states is no 

longer follow by pcp due to lack of insurance. pt works as , pt

is ambulatory to baseline per patient there is no numbness no tingling no loss 

or decrease in bowel or bladder function. 





MD Complaint: back pain


Onset/Timin


-: week(s)


Similar Symptoms Previously: Yes


Place: home


Radiation: left leg, right leg


Severity: moderate


Severity scale (0 -10): 4


Quality: aching


Consistency: intermittent


Improves With: other (nsaids rest)


Worsens With: movement (prolonged sitting standing )


Context: turning/twisting


Associated Symptoms: denies: weakness, numbness, difficulty walking, difficulty 

urinating, incontinence, fever/chills, constipation, rash





- Related Data


                                Home Medications











 Medication  Instructions  Recorded  Confirmed  Last Taken


 


Lisinopril [Zestril TAB] 10 mg PO QDAY 17


 


hydroCHLOROthiazide [Hctz] 12.5 mg PO QDAY 17








                                  Previous Rx's











 Medication  Instructions  Recorded  Last Taken  Type


 


Cyclobenzaprine [Flexeril] 10 mg PO TID PRN #30 tablet 19 Unknown Rx


 


Menthol/Camphor [Tiger Balm 1 applicatio TP QID PRN #1 tube 19 Unknown Rx





Ointment]    


 


Naproxen 500 mg PO BID PRN #30 tablet 19 Unknown Rx











                                    Allergies











Allergy/AdvReac Type Severity Reaction Status Date / Time


 


No Known Allergies Allergy   Verified 17 02:51














ED Review of Systems


ROS: 


Stated complaint: LOWER BACK PAIN


Other details as noted in HPI





Constitutional: denies: chills, fever


Eyes: denies: eye pain, eye discharge, vision change


ENT: denies: ear pain, throat pain


Respiratory: denies: cough, shortness of breath, wheezing


Cardiovascular: denies: chest pain, palpitations


Endocrine: no symptoms reported


Gastrointestinal: denies: abdominal pain, nausea, diarrhea


Genitourinary: denies: urgency, dysuria


Musculoskeletal: back pain, arthralgia


Skin: denies: rash, lesions


Neurological: denies: headache, weakness, paresthesias


Psychiatric: denies: anxiety, depression


Hematological/Lymphatic: denies: easy bleeding, easy bruising





ED Past Medical Hx





- Past Medical History


Hx Hypertension: Yes





- Surgical History


Past Surgical History?: No





- Social History


Smoking Status: Never Smoker


Substance Use Type: Alcohol





- Medications


Home Medications: 


                                Home Medications











 Medication  Instructions  Recorded  Confirmed  Last Taken  Type


 


Lisinopril [Zestril TAB] 10 mg PO QDAY 17 History


 


hydroCHLOROthiazide [Hctz] 12.5 mg PO QDAY 17 History


 


Cyclobenzaprine [Flexeril] 10 mg PO TID PRN #30 tablet 19  Unknown Rx


 


Menthol/Camphor [Tiger Balm 1 applicatio TP QID PRN #1 tube 19  Unknown Rx





Ointment]     


 


Naproxen 500 mg PO BID PRN #30 tablet 19  Unknown Rx














ED Physical Exam





- General


Limitations: No Limitations


General appearance: alert, in no apparent distress





- Head


Head exam: Present: atraumatic, normocephalic





- Eye


Eye exam: Present: normal appearance





- ENT


ENT exam: Present: mucous membranes moist





- Neck


Neck exam: Present: normal inspection, full ROM.  Absent: tenderness, 

meningismus, lymphadenopathy, thyromegaly





- Respiratory


Respiratory exam: Present: normal lung sounds bilaterally.  Absent: respiratory 

distress





- Cardiovascular


Cardiovascular Exam: Present: regular rate, normal rhythm, normal heart sounds. 

Absent: systolic murmur, diastolic murmur, rubs, gallop





- GI/Abdominal


GI/Abdominal exam: Present: soft, normal bowel sounds.  Absent: tenderness, 

bruit, hernia





- Rectal


Rectal exam: Present: deferred





- Extremities Exam


Extremities exam: Present: normal inspection, full ROM, normal capillary refill.

 Absent: tenderness, pedal edema, joint swelling





- Back Exam


Back exam: Present: normal inspection, full ROM, muscle spasm, rash noted.  

Absent: tenderness, CVA tenderness (R), CVA tenderness (L), paraspinal 

tenderness, vertebral tenderness





- Expanded Back Exam


  ** Expanded


Back exam: Present: other (no posterior vertebral point tendreness).  Absent: 

saddle anesthesia


Back exam: Positive Straight Leg Raise: Left, Right





- Neurological Exam


Neurological exam: Present: alert, oriented X3, CN II-XII intact, normal gait, 

reflexes normal.  Absent: motor sensory deficit





- Expanded Neurological Exam


  ** Expanded


Patient oriented to: Present: person, place, time


Speech: Present: fluid speech


Cranial nerves: EOM's Intact: Normal, Gag Reflex: Normal, Tongue Deviation: 

Normal, Nystagmus: Normal, Facial Sensation: Normal


Cerebellar function: Finger to Nose: Normal, Heel to Shin: Normal, Romberg: 

Normal


Upper motor neuron: [place] Neglect: Normal, Pronator Drift: Normal, Babinski Sign:

Normal, Sensory Extinction: Normal


Sensory exam: Upper Extremity Light Touch: Normal, Upper Extremity Pin Prick: 

Normal, Upper Extremity Temperature: Normal, UE 2 Point Discrimination: Normal, 

Lower Extremity Light Touch: Normal, Lower Extremity Pin Prick: Normal, Lower 

Extremity Temperature: Normal, LE 2 Point Discrimination: Normal


Motor strength exam: RUE: 5, LUE: 5, RLE: 5, LLE: 5


DTR: knee (R): 2+, knee (L): 2+, ankle (R): 2+, ankle (L): 2+


Best Eye Response ([place]): (4) open spontaneously


Best Motor Response ([place]): (6) obeys commands


Best Verbal Response ([place]): (5) oriented


[place] Total: 15





- Psychiatric


Psychiatric exam: Present: normal affect, normal mood





- Skin


Skin exam: Present: warm, dry, intact, normal color.  Absent: rash





ED Course





                                   Vital Signs











  19





  20:44 20:51


 


Temperature 98.3 F 98.3 F


 


Pulse Rate 60 58 L


 


Respiratory 18 18





Rate  


 


Blood Pressure 149/96 149/96


 


O2 Sat by Pulse 98 98





Oximetry  














ED Medical Decision Making





- Radiology Data


Radiology results: report reviewed, image reviewed





- Medical Decision Making


this acute on chronic back pain , improved with medications given in ed plan: 

refill naproxen flexeril tiger balm follow referral to orthopedics Dr Long in 

2-3 days pt verbalized aggreement and understanding of same.   





Critical care attestation.: 


If time is entered above; I have spent that time in minutes in the direct care 

of this critically ill patient, excluding procedure time.








ED Disposition


Clinical Impression: 


Chronic back pain


Qualifiers:


 Back pain location: low back pain Back pain laterality: bilateral Sciatica 

presence: with sciatica Sciatica laterality: sciatica laterality unspecified 

Qualified Code(s): M54.40 - Lumbago with sciatica, unspecified side; G89.29 - 

Other chronic pain





Disposition:  TO HOME OR SELFCARE


Is pt being admited?: No


Does the pt Need Aspirin: No


Condition: Stable


Instructions:  Low Back Strain (ED), Chronic Back Pain (ED)


Prescriptions: 


Cyclobenzaprine [Flexeril] 10 mg PO TID PRN #30 tablet


 PRN Reason: Muscle Spasm


Menthol/Camphor [Tiger Balm Ointment] 1 applicatio TP QID PRN #1 tube


 PRN Reason: Pain , Severe (7-10)


Naproxen 500 mg PO BID PRN #30 tablet


 PRN Reason: pain


Referrals: 


FELICITY LONG MD [Staff Physician] - 3-5 Days


Forms:  Work/School Release Form(ED)


Time of Disposition: 01:23